# Patient Record
Sex: MALE | Race: OTHER | HISPANIC OR LATINO | URBAN - METROPOLITAN AREA
[De-identification: names, ages, dates, MRNs, and addresses within clinical notes are randomized per-mention and may not be internally consistent; named-entity substitution may affect disease eponyms.]

---

## 2022-05-25 ENCOUNTER — EMERGENCY (EMERGENCY)
Facility: HOSPITAL | Age: 47
LOS: 0 days | Discharge: HOME | End: 2022-05-25
Attending: EMERGENCY MEDICINE
Payer: SELF-PAY

## 2022-05-25 VITALS
DIASTOLIC BLOOD PRESSURE: 61 MMHG | TEMPERATURE: 98 F | HEART RATE: 57 BPM | OXYGEN SATURATION: 99 % | RESPIRATION RATE: 18 BRPM | SYSTOLIC BLOOD PRESSURE: 112 MMHG

## 2022-05-25 VITALS — WEIGHT: 173.06 LBS | HEIGHT: 60 IN

## 2022-05-25 DIAGNOSIS — S50.812A ABRASION OF LEFT FOREARM, INITIAL ENCOUNTER: ICD-10-CM
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
## 2022-05-25 DIAGNOSIS — M79.632 PAIN IN LEFT FOREARM: ICD-10-CM
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
## 2022-05-25 PROCEDURE — 99282 EMERGENCY DEPT VISIT SF MDM: CPT

## 2022-05-25 NOTE — ED STATDOCS - PROGRESS NOTE DETAILS
45 y/o Male presents to ED c/o bleeding from left arm s/p scratching his arm last night.  Pt reports that the area was itchy, he was scratching it,. and now he keeps bleeding from site.  Pt with PMHx of Stroke.  On Plavix.  On exam, pinpoint area of abrasion to left anterior forearm with continued bleeding.  I applied Surgifoam and placed pressure dressing.  Through , pt instructed to leave dressing In place for 48 hours.  Not to pick at area s/p removal.  Can loosen dressing if hand cool, tingling, turning pale.  Will dc home.  Instructed not to stop Plavix.  Latonia Mullins PA-C

## 2022-05-25 NOTE — ED STATDOCS - PATIENT PORTAL LINK FT
You can access the FollowMyHealth Patient Portal offered by [facility] by registering at the following website: [email or web address]. By joining [facility] FollowMyHealth portal, you will also be able to view your health information using other applications (apps) compatible with our system.

## 2022-05-25 NOTE — ED STATDOCS - OBJECTIVE STATEMENT
45 yo male w/no pertinent PMH presents to the ED for abrasion to left forearm and uncontrolled bleeding. Pt is on Plavix.

## 2022-05-25 NOTE — ED STATDOCS - ATTENDING CONTRIBUTION TO CARE
I,Jose Flor MD,  performed the initial face to face bedside interview with this patient regarding history of present illness, review of symptoms and relevant past medical, social and family history.  I completed an independent physical examination.  I was the initial provider who evaluated this patient. I have signed out the follow up of any pending tests (i.e. labs, radiological studies) to the ACP.  I have communicated the patient’s plan of care and disposition with the ACP.  The history, relevant review of systems, past medical and surgical history, medical decision making, and physical examination was documented by the scribe in my presence and I attest to the accuracy of the documentation.

## 2022-05-25 NOTE — ED STATDOCS - NS ED ATTENDING STATEMENT MOD
I have seen and examined this patient and fully participated in the care of this patient as the teaching attending.  The service was shared with the KAMRYN.  I reviewed and verified the documentation and independently performed the documented:

## 2022-07-27 NOTE — ED ADULT NURSE NOTE - NSIMPLEMENTINTERV_GEN_ALL_ED
Detail Level: Simple
Add 62137 Cpt? (Important Note: In 2017 The Use Of 05089 Is Being Tracked By Cms To Determine Future Global Period Reimbursement For Global Periods): yes
Implemented All Universal Safety Interventions:  [place] to call system. Call bell, personal items and telephone within reach. Instruct patient to call for assistance. Room bathroom lighting operational. Non-slip footwear when patient is off stretcher. Physically safe environment: no spills, clutter or unnecessary equipment. Stretcher in lowest position, wheels locked, appropriate side rails in place.